# Patient Record
Sex: MALE | Race: WHITE | NOT HISPANIC OR LATINO | Employment: FULL TIME | URBAN - METROPOLITAN AREA
[De-identification: names, ages, dates, MRNs, and addresses within clinical notes are randomized per-mention and may not be internally consistent; named-entity substitution may affect disease eponyms.]

---

## 2023-07-25 ENCOUNTER — OFFICE VISIT (OUTPATIENT)
Dept: URGENT CARE | Facility: URGENT CARE | Age: 28
End: 2023-07-25
Payer: COMMERCIAL

## 2023-07-25 VITALS
OXYGEN SATURATION: 98 % | WEIGHT: 149.19 LBS | DIASTOLIC BLOOD PRESSURE: 68 MMHG | RESPIRATION RATE: 16 BRPM | SYSTOLIC BLOOD PRESSURE: 108 MMHG | TEMPERATURE: 98.2 F | HEART RATE: 74 BPM

## 2023-07-25 DIAGNOSIS — R07.0 THROAT PAIN: ICD-10-CM

## 2023-07-25 DIAGNOSIS — K12.2 UVULITIS: Primary | ICD-10-CM

## 2023-07-25 PROBLEM — J34.3 HYPERTROPHY OF NASAL TURBINATES: Status: ACTIVE | Noted: 2021-12-02

## 2023-07-25 PROBLEM — J32.9 CHRONIC SINUSITIS: Status: ACTIVE | Noted: 2021-12-02

## 2023-07-25 LAB
DEPRECATED S PYO AG THROAT QL EIA: NEGATIVE
GROUP A STREP BY PCR: NOT DETECTED

## 2023-07-25 PROCEDURE — 87651 STREP A DNA AMP PROBE: CPT | Performed by: NURSE PRACTITIONER

## 2023-07-25 PROCEDURE — 99203 OFFICE O/P NEW LOW 30 MIN: CPT | Performed by: NURSE PRACTITIONER

## 2023-07-25 RX ORDER — DEXAMETHASONE SODIUM PHOSPHATE 10 MG/ML
10 INJECTION INTRAMUSCULAR; INTRAVENOUS ONCE
Status: COMPLETED | OUTPATIENT
Start: 2023-07-25 | End: 2023-07-25

## 2023-07-25 RX ADMIN — DEXAMETHASONE SODIUM PHOSPHATE 10 MG: 10 INJECTION INTRAMUSCULAR; INTRAVENOUS at 15:24

## 2023-07-25 NOTE — PROGRESS NOTES
Chief Complaint   Patient presents with     Pharyngitis     2 days, uvula is swollen     SUBJECTIVE:  Natan Ryan is a 27 year old male presenting with sore throat swollen uvula over the last 2 days.  He says the pain is improving and moderate now.  No fever sweats chills headache nausea rash ill contacts severe fatigue.    No past medical history on file.  No current outpatient medications on file prior to visit.  No current facility-administered medications on file prior to visit.    Social History     Tobacco Use     Smoking status: Never     Smokeless tobacco: Never   Substance Use Topics     Alcohol use: Not on file     No Known Allergies    Review of Systems   All systems negative except for those listed above in HPI.    OBJECTIVE:   /68   Pulse 74   Temp 98.2  F (36.8  C)   Resp 16   Wt 67.7 kg (149 lb 3 oz)   SpO2 98%      Physical Exam  Vitals reviewed.   Constitutional:       Appearance: Normal appearance.   HENT:      Head: Normocephalic and atraumatic.      Nose: Nose normal.      Mouth/Throat:      Mouth: Mucous membranes are moist.      Pharynx: Oropharyngeal exudate and posterior oropharyngeal erythema present.      Comments: Red swollen uvula with tiny white dots, tonsils unremarkable.  Cardiovascular:      Rate and Rhythm: Normal rate.   Pulmonary:      Effort: Pulmonary effort is normal.      Breath sounds: Normal breath sounds.   Abdominal:      General: Abdomen is flat.      Palpations: Abdomen is soft.   Musculoskeletal:         General: Normal range of motion.      Cervical back: Normal range of motion and neck supple.   Lymphadenopathy:      Cervical: No cervical adenopathy.   Skin:     General: Skin is warm and dry.      Findings: No rash.   Neurological:      General: No focal deficit present.      Mental Status: He is alert and oriented to person, place, and time.   Psychiatric:         Mood and Affect: Mood normal.         Behavior: Behavior normal.       Results for  orders placed or performed in visit on 07/25/23   Streptococcus A Rapid Screen w/Reflex to PCR - Clinic Collect     Status: Normal    Specimen: Throat; Swab   Result Value Ref Range    Group A Strep antigen Negative Negative     ASSESSMENT:    ICD-10-CM    1. Uvulitis  K12.2 dexamethasone (DECADRON) injectable solution used ORALLY 10 mg      2. Throat pain  R07.0 Streptococcus A Rapid Screen w/Reflex to PCR - Clinic Collect     Group A Streptococcus PCR Throat Swab        PLAN:     Rapid strep test today is negative.   Your throat culture is pending. Urgent Care will call if positive results to start antibiotics at that time; No call if the culture is negative.  Patient wants to hold on CBC as he normally faints  Decadron given for uvulitis  Drink plenty of fluids and rest.  May use salt water gargles- about 8 oz warm water with about 1 teaspoon salt  Sucrets and Cepacol spray are over the counter medications that numb the throat.  Over the counter pain relievers such as tylenol or ibuprofen may be used as needed.   Mucinex is product known to help loosen congestion and thin mucus (generic is guaifenesin)   Delsym 12 hour over the counter works well for cough.  Honey has been shown to be helpful in cough management and is soothing to a sore throat. May add to lemon tea.  Please follow up with primary care provider if not improving, worsening or new symptoms.    Follow up with primary care provider with any problems, questions or concerns or if symptoms worsen or fail to improve. Patient agreed to plan and verbalized understanding.    RAFAEL Martinez  [facility]

## 2023-10-22 ENCOUNTER — HEALTH MAINTENANCE LETTER (OUTPATIENT)
Age: 28
End: 2023-10-22

## 2024-11-13 ENCOUNTER — OFFICE VISIT (OUTPATIENT)
Dept: FAMILY MEDICINE | Facility: CLINIC | Age: 29
End: 2024-11-13
Payer: COMMERCIAL

## 2024-11-13 VITALS
DIASTOLIC BLOOD PRESSURE: 84 MMHG | BODY MASS INDEX: 22.22 KG/M2 | WEIGHT: 155.2 LBS | OXYGEN SATURATION: 98 % | SYSTOLIC BLOOD PRESSURE: 120 MMHG | RESPIRATION RATE: 14 BRPM | HEIGHT: 70 IN | TEMPERATURE: 98.2 F | HEART RATE: 96 BPM

## 2024-11-13 DIAGNOSIS — Z23 NEED FOR TETANUS BOOSTER: ICD-10-CM

## 2024-11-13 DIAGNOSIS — Z00.00 ENCOUNTER FOR MEDICAL EXAMINATION TO ESTABLISH CARE: Primary | ICD-10-CM

## 2024-11-13 DIAGNOSIS — Z11.4 SCREENING FOR HIV (HUMAN IMMUNODEFICIENCY VIRUS): ICD-10-CM

## 2024-11-13 DIAGNOSIS — Z11.59 NEED FOR HEPATITIS C SCREENING TEST: ICD-10-CM

## 2024-11-13 LAB
HCV AB SERPL QL IA: NONREACTIVE
HIV 1+2 AB+HIV1 P24 AG SERPL QL IA: NONREACTIVE

## 2024-11-13 RX ORDER — FLUTICASONE PROPIONATE 50 MCG
2 SPRAY, SUSPENSION (ML) NASAL DAILY
COMMUNITY

## 2024-11-13 RX ORDER — LORATADINE 10 MG/1
TABLET ORAL
COMMUNITY
Start: 2024-09-01

## 2024-11-13 NOTE — PROGRESS NOTES
"  Assessment & Plan     Encounter for medical examination to establish care  Reassuring exam no acute issues will do screening today    Screening for HIV (human immunodeficiency virus)    - HIV Screening; Future  - HIV Screening    Need for hepatitis C screening test    - Hepatitis C Screen Reflex to HCV RNA Quant and Genotype; Future  - Hepatitis C Screen Reflex to HCV RNA Quant and Genotype    Need for tetanus booster    - TDAP 10-64Y (ADACEL,BOOSTRIX)    Reviewed other immunizations and provided advance care planning document.  Also discussed hepatitis B vaccination will check with his records and schedule a time to get it if he does need it.            Return if symptoms worsen or fail to improve.    Shant Gama is a 28 year old, presenting for the following health issues:  New Patient (Establishing care )        11/13/2024     8:14 AM   Additional Questions   Roomed by John         11/13/2024    Information    services provided? No        HPI   Establish Care  Generally feels well presenting today to establish care we reviewed past medical history family history and medications.  Stress  Patient is feels some level of increased stress both with the election and also with changes in his own life considering proposing to his partner.  Epigastric Pain  Does report some epigastric pain that was worse after the election though has now improved uses down to Gaviscon as needed.  Stress related, worsened by election stress                 Objective    /84   Pulse 96   Temp 98.2  F (36.8  C) (Temporal)   Resp 14   Ht 1.778 m (5' 10\")   Wt 70.4 kg (155 lb 3.2 oz)   SpO2 98%   BMI 22.27 kg/m    Body mass index is 22.27 kg/m .  Physical Exam  Vitals reviewed.   Constitutional:       General: He is not in acute distress.     Appearance: He is well-developed. He is not diaphoretic.   HENT:      Head: Normocephalic.   Eyes:      General: No scleral icterus.     Conjunctiva/sclera: " Conjunctivae normal.   Neck:      Thyroid: No thyromegaly.   Cardiovascular:      Rate and Rhythm: Normal rate and regular rhythm.      Heart sounds: Normal heart sounds. No murmur heard.  Pulmonary:      Effort: Pulmonary effort is normal. No respiratory distress.      Breath sounds: Normal breath sounds. No wheezing.   Musculoskeletal:      Cervical back: Normal range of motion.      Left lower leg: No edema.   Skin:     General: Skin is warm and dry.   Neurological:      Mental Status: He is alert and oriented to person, place, and time. Mental status is at baseline.   Psychiatric:         Behavior: Behavior normal.         Thought Content: Thought content normal.         Judgment: Judgment normal.                    Signed Electronically by: Yong Lau MD

## 2024-12-15 ENCOUNTER — HEALTH MAINTENANCE LETTER (OUTPATIENT)
Age: 29
End: 2024-12-15

## 2025-06-09 ENCOUNTER — OFFICE VISIT (OUTPATIENT)
Dept: PEDIATRICS | Facility: CLINIC | Age: 30
End: 2025-06-09
Payer: COMMERCIAL

## 2025-06-09 VITALS
HEIGHT: 69 IN | BODY MASS INDEX: 22.59 KG/M2 | TEMPERATURE: 97.8 F | RESPIRATION RATE: 17 BRPM | SYSTOLIC BLOOD PRESSURE: 125 MMHG | HEART RATE: 98 BPM | DIASTOLIC BLOOD PRESSURE: 71 MMHG | WEIGHT: 152.5 LBS | OXYGEN SATURATION: 97 %

## 2025-06-09 DIAGNOSIS — R09.82 POST-NASAL DRIP: Primary | ICD-10-CM

## 2025-06-09 PROCEDURE — 99214 OFFICE O/P EST MOD 30 MIN: CPT | Performed by: STUDENT IN AN ORGANIZED HEALTH CARE EDUCATION/TRAINING PROGRAM

## 2025-06-09 PROCEDURE — 1126F AMNT PAIN NOTED NONE PRSNT: CPT | Performed by: STUDENT IN AN ORGANIZED HEALTH CARE EDUCATION/TRAINING PROGRAM

## 2025-06-09 PROCEDURE — 3074F SYST BP LT 130 MM HG: CPT | Performed by: STUDENT IN AN ORGANIZED HEALTH CARE EDUCATION/TRAINING PROGRAM

## 2025-06-09 PROCEDURE — 3078F DIAST BP <80 MM HG: CPT | Performed by: STUDENT IN AN ORGANIZED HEALTH CARE EDUCATION/TRAINING PROGRAM

## 2025-06-09 PROCEDURE — G2211 COMPLEX E/M VISIT ADD ON: HCPCS | Performed by: STUDENT IN AN ORGANIZED HEALTH CARE EDUCATION/TRAINING PROGRAM

## 2025-06-09 RX ORDER — IPRATROPIUM BROMIDE 42 UG/1
2 SPRAY, METERED NASAL 4 TIMES DAILY
Qty: 15 ML | Refills: 0 | Status: SHIPPED | OUTPATIENT
Start: 2025-06-09

## 2025-06-09 ASSESSMENT — PAIN SCALES - GENERAL: PAINLEVEL_OUTOF10: NO PAIN (0)

## 2025-06-09 NOTE — PROGRESS NOTES
Assessment & Plan     Post-nasal drip  Notes a history of chronic congestion and post nasal drip for which he was seen by ENT in the past.  Had a surgical intervention with improvement of symptoms in 2022, however as of October 2024 notes that he hsa has worsening nasal congestion that has been significantly bothersome.  He has tried a number of nasal sprays as well as anitihistamines.  At this point recommend a trial of atrovent and referral to ENT given history and persistence of symptoms   - Adult ENT  Referral; Future  - ipratropium (ATROVENT) 0.06 % nasal spray; Spray 2 sprays into both nostrils 4 times daily.    The longitudinal plan of care for the diagnosis(es)/condition(s) as documented were addressed during this visit. Due to the added complexity in care, I will continue to support Natan in the subsequent management and with ongoing continuity of care.            Subjective   Natan is a 29 year old, presenting for the following health issues:  Sinus Problem (Strong persistent post nasal drip )      6/9/2025     1:30 PM   Additional Questions   Roomed by Amy Flores MA     Sinus Problem     History of Present Illness       Reason for visit:  Constant post-nasal drip causing difficulty swallowing & increased anxiety  Symptom onset:  More than a month  Symptoms include:  Difficulty swallowing, feeling of constant mucus flow in back of throat  Symptom intensity:  Moderate  Symptom progression:  Staying the same  Had these symptoms before:  No  What makes it worse:  Random things that I eat or drink  What makes it better:  No He is missing 1 dose(s) of medications per week.  He is not taking prescribed medications regularly due to remembering to take.      Issues starting in October    Difficulty with swallowing due to mucous   Feels like wallowing is not as storng   Nothing getting stuck   Not throat clearing   Feels like there is a glob in his throat/ from his sinuses  No cough   No ear pain   No  "issues with eating or food getting stuck    Feels like its all in the back  Nothing comes out with blowing nose     Constant post nasal drip    Has seen ENT in the past   - surgery in 2022    Has tried a lot of meds since nothing helped       Tried:     Flonase  Albuterol  Zyrtec   Mometasone  Mucinex  Claritin  Afirin  Oxymetazine  nexium    Nasal rinses     No changes in Octover \  Has a cat, but not new  Has moved houses and no improvement                   Objective    /71 (BP Location: Right arm, Patient Position: Sitting, Cuff Size: Adult Regular)   Pulse 98   Temp 97.8  F (36.6  C) (Temporal)   Resp 17   Ht 1.759 m (5' 9.25\")   Wt 69.2 kg (152 lb 8 oz)   SpO2 97%   BMI 22.36 kg/m    Body mass index is 22.36 kg/m .  Physical Exam   GENERAL: alert and no distress  EYES: Eyes grossly normal to inspection, PERRL and conjunctivae and sclerae normal  HENT: ear canals and TM's normal, nose and mouth without ulcers or lesions  NECK: no adenopathy, no asymmetry, masses, or scars  RESP: lungs clear to auscultation - no rales, rhonchi or wheezes  CV: regular rate and rhythm, normal S1 S2, no S3 or S4, no murmur, click or rub, no peripheral edema  PSYCH: mentation appears normal, affect normal/bright            Signed Electronically by: Carlie Blanco MD    "

## 2025-06-10 ENCOUNTER — PATIENT OUTREACH (OUTPATIENT)
Dept: CARE COORDINATION | Facility: CLINIC | Age: 30
End: 2025-06-10
Payer: COMMERCIAL

## 2025-06-12 ENCOUNTER — PATIENT OUTREACH (OUTPATIENT)
Dept: CARE COORDINATION | Facility: CLINIC | Age: 30
End: 2025-06-12
Payer: COMMERCIAL

## 2025-06-24 NOTE — TELEPHONE ENCOUNTER
FUTURE VISIT INFORMATION:  Appointment Date: 9/22/25  Appointment Time: 3 PM   REFERRAL INFORMATION:  Referring By: Carlie Blanco MD   Referring [facility]: [facility]   Reason for Visit/Diagnosis: Post-nasal drip, ref'd by Carlie Blanco MD, pt made appt [facility] location      NOTES STATUS DETAILS   OFFICE NOTE from referring provider Epic [facility]   6/9/25 OV: Carlie Blanco MD    OFFICE NOTE from other specialist Care Everywhere   ENT  2/28/22 OV: Dorota Elmore MD     OPERATIVE REPORTS Care Everywhere [facility]  1/4/22 bilateral abiodun bullosa takedown with anterior ethmoidectomy and maxillary anstrostomy    IMAGES *pertaining images & report*       CT, MRI, PET, NM, US, XRAYS, etc ... Pending request [facility]  4/22/21 CT sinus

## 2025-09-22 ENCOUNTER — PRE VISIT (OUTPATIENT)
Dept: OTOLARYNGOLOGY | Facility: CLINIC | Age: 30
End: 2025-09-22